# Patient Record
Sex: FEMALE | Race: WHITE | Employment: FULL TIME | URBAN - METROPOLITAN AREA
[De-identification: names, ages, dates, MRNs, and addresses within clinical notes are randomized per-mention and may not be internally consistent; named-entity substitution may affect disease eponyms.]

---

## 2021-09-10 ENCOUNTER — HOSPITAL ENCOUNTER (OUTPATIENT)
Dept: LAB | Age: 41
Discharge: HOME | End: 2021-09-10

## 2021-09-10 LAB — SENTARA SPECIMEN COL,SENBCF: NORMAL

## 2021-09-10 PROCEDURE — 99001 SPECIMEN HANDLING PT-LAB: CPT

## 2022-05-03 ENCOUNTER — APPOINTMENT (OUTPATIENT)
Dept: CT IMAGING | Age: 42
End: 2022-05-03
Attending: PHYSICIAN ASSISTANT
Payer: COMMERCIAL

## 2022-05-03 ENCOUNTER — HOSPITAL ENCOUNTER (EMERGENCY)
Age: 42
Discharge: HOME | End: 2022-05-03
Attending: STUDENT IN AN ORGANIZED HEALTH CARE EDUCATION/TRAINING PROGRAM
Payer: COMMERCIAL

## 2022-05-03 VITALS
BODY MASS INDEX: 24.99 KG/M2 | WEIGHT: 150 LBS | HEART RATE: 113 BPM | SYSTOLIC BLOOD PRESSURE: 164 MMHG | HEIGHT: 65 IN | OXYGEN SATURATION: 98 % | DIASTOLIC BLOOD PRESSURE: 114 MMHG | TEMPERATURE: 97.3 F | RESPIRATION RATE: 16 BRPM

## 2022-05-03 DIAGNOSIS — S16.1XXA STRAIN OF NECK MUSCLE, INITIAL ENCOUNTER: Primary | ICD-10-CM

## 2022-05-03 DIAGNOSIS — S39.012A LOW BACK STRAIN, INITIAL ENCOUNTER: ICD-10-CM
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
## 2022-05-03 DIAGNOSIS — S30.0XXA PELVIC CONTUSION, INITIAL ENCOUNTER: ICD-10-CM

## 2022-05-03 LAB — HCG UR QL: NEGATIVE

## 2022-05-03 PROCEDURE — 99284 EMERGENCY DEPT VISIT MOD MDM: CPT

## 2022-05-03 PROCEDURE — 81025 URINE PREGNANCY TEST: CPT

## 2022-05-03 PROCEDURE — 74011250636 HC RX REV CODE- 250/636: Performed by: PHYSICIAN ASSISTANT

## 2022-05-03 PROCEDURE — 72131 CT LUMBAR SPINE W/O DYE: CPT

## 2022-05-03 PROCEDURE — 72125 CT NECK SPINE W/O DYE: CPT

## 2022-05-03 PROCEDURE — 74011250637 HC RX REV CODE- 250/637: Performed by: PHYSICIAN ASSISTANT

## 2022-05-03 PROCEDURE — 96372 THER/PROPH/DIAG INJ SC/IM: CPT

## 2022-05-03 PROCEDURE — 72192 CT PELVIS W/O DYE: CPT

## 2022-05-03 RX ORDER — METHOCARBAMOL 750 MG/1
750 TABLET, FILM COATED ORAL 4 TIMES DAILY
Qty: 20 TABLET | Refills: 0 | Status: SHIPPED | OUTPATIENT
Start: 2022-05-03 | End: 2022-05-08

## 2022-05-03 RX ORDER — IBUPROFEN 600 MG/1
600 TABLET ORAL
Qty: 20 TABLET | Refills: 0 | Status: SHIPPED | OUTPATIENT
Start: 2022-05-03

## 2022-05-03 RX ORDER — ONDANSETRON 4 MG/1
4 TABLET, ORALLY DISINTEGRATING ORAL
Status: COMPLETED | OUTPATIENT
Start: 2022-05-03 | End: 2022-05-03

## 2022-05-03 RX ORDER — HYDROMORPHONE HYDROCHLORIDE 1 MG/ML
1 INJECTION, SOLUTION INTRAMUSCULAR; INTRAVENOUS; SUBCUTANEOUS
Status: COMPLETED | OUTPATIENT
Start: 2022-05-03 | End: 2022-05-03

## 2022-05-03 RX ORDER — OXYCODONE AND ACETAMINOPHEN 5; 325 MG/1; MG/1
1 TABLET ORAL
Qty: 12 TABLET | Refills: 0 | Status: SHIPPED | OUTPATIENT
Start: 2022-05-03 | End: 2022-05-06

## 2022-05-03 RX ORDER — KETOROLAC TROMETHAMINE 30 MG/ML
30 INJECTION, SOLUTION INTRAMUSCULAR; INTRAVENOUS
Status: COMPLETED | OUTPATIENT
Start: 2022-05-03 | End: 2022-05-03

## 2022-05-03 RX ADMIN — ONDANSETRON 4 MG: 4 TABLET, ORALLY DISINTEGRATING ORAL at 15:19

## 2022-05-03 RX ADMIN — HYDROMORPHONE HYDROCHLORIDE 1 MG: 1 INJECTION, SOLUTION INTRAMUSCULAR; INTRAVENOUS; SUBCUTANEOUS at 15:19

## 2022-05-03 RX ADMIN — KETOROLAC TROMETHAMINE 30 MG: 30 INJECTION, SOLUTION INTRAMUSCULAR at 15:19

## 2022-05-03 NOTE — ED PROVIDER NOTES
EMERGENCY DEPARTMENT HISTORY AND PHYSICAL EXAM    Date: 5/3/2022  Patient Name: Jordana Hill    History of Presenting Illness     Chief Complaint   Patient presents with   Prince Raygoza Fall    Back Pain         History Provided By: Patient    Chief Complaint: fall, back pain    HPI(Context):   12:49 PM  Jordana Hill is a 46 y.o. female who presents to the emergency department C/O fall on stairs. Associated sxs include low back pain and neck soreness. Pt had mechanical fall at home down 12 carpeted stairs one day ago. pt was seen at [facility] ED for same yesterday. Xray lumbar spine and pelvis unremarkable. Pt notes pain has not improved. Worse with movement. Pt denies urinary retention, urinary/fecal incontinence, head trauma, LOC, blood thinner use, abdominal pain, and any other sxs or complaints. PCP: Sandra Villegas MD        Past History     Past Medical History:  No past medical history on file. Past Surgical History:  No past surgical history on file. Family History:  No family history on file. Social History:  Social History     Tobacco Use    Smoking status: Not on file    Smokeless tobacco: Not on file   Substance Use Topics    Alcohol use: Not on file    Drug use: Not on file       Allergies:  No Known Allergies      Review of Systems   Review of Systems   Gastrointestinal: Negative for nausea and vomiting. Genitourinary: Negative for difficulty urinating. Musculoskeletal: Positive for back pain, myalgias, neck pain and neck stiffness. Neurological: Negative for dizziness, syncope, weakness, light-headedness, numbness and headaches. Hematological: Does not bruise/bleed easily. All other systems reviewed and are negative. Physical Exam     Vitals:    05/03/22 1220   BP: (!) 164/114   Pulse: (!) 113   Resp: 16   Temp: 97.3 °F (36.3 °C)   SpO2: 98%   Weight: 68 kg (150 lb)   Height: 5' 5\" (1.651 m)     Physical Exam  Vitals and nursing note reviewed.    Constitutional:       General: She is not in acute distress. Appearance: She is well-developed. She is not diaphoretic. Comments:  female that appears uncomfortable. Anxious. In C collar. Answering questions. Following directions. HENT:      Head: Normocephalic and atraumatic. No raccoon eyes, Manzanares's sign, right periorbital erythema or left periorbital erythema. Right Ear: External ear normal. No hemotympanum. Left Ear: External ear normal. No hemotympanum. Nose: Nose normal. No mucosal edema or rhinorrhea. Right Sinus: No maxillary sinus tenderness or frontal sinus tenderness. Left Sinus: No maxillary sinus tenderness or frontal sinus tenderness. Mouth/Throat:      Mouth: No oral lesions. Dentition: No dental abscesses. Pharynx: Uvula midline. No oropharyngeal exudate, posterior oropharyngeal erythema or uvula swelling. Tonsils: No tonsillar abscesses. Eyes:      General: No scleral icterus. Right eye: No discharge. Left eye: No discharge. Conjunctiva/sclera: Conjunctivae normal.   Cardiovascular:      Rate and Rhythm: Regular rhythm. Tachycardia present. Heart sounds: Normal heart sounds. No murmur heard. No friction rub. No gallop. Pulmonary:      Effort: Pulmonary effort is normal. No tachypnea, accessory muscle usage or respiratory distress. Breath sounds: Normal breath sounds. No decreased breath sounds, wheezing, rhonchi or rales. Abdominal:      Palpations: Abdomen is soft. Tenderness: There is no abdominal tenderness. There is no right CVA tenderness, left CVA tenderness, guarding or rebound. Negative signs include McBurney's sign. Musculoskeletal:      Cervical back: Normal range of motion. No erythema or signs of trauma. Pain with movement, spinous process tenderness and muscular tenderness present. Normal range of motion. Thoracic back: Normal. No swelling or deformity. Normal range of motion.       Lumbar back: Tenderness (left lumbar paraspinal muscle tenderness) present. No swelling or deformity. Decreased range of motion. Skin:     General: Skin is warm and dry. Neurological:      Mental Status: She is alert and oriented to person, place, and time. Mental status is at baseline. GCS: GCS eye subscore is 4. GCS verbal subscore is 5. GCS motor subscore is 6. Cranial Nerves: No cranial nerve deficit. Sensory: Sensation is intact. Motor: Motor function is intact. Coordination: Coordination is intact. Gait: Gait is intact. Psychiatric:         Mood and Affect: Mood is anxious. Judgment: Judgment normal.      Comments: Anxious and tearful at times             Diagnostic Study Results     Labs -     Recent Results (from the past 12 hour(s))   HCG URINE, QL. - POC    Collection Time: 05/03/22  2:08 PM   Result Value Ref Range    Pregnancy test,urine (POC) Negative NEG         Radiologic Studies     CT PELV WO CONT   Final Result      No evidence of acute fracture or dislocation. Fluid and gas within the lower cervix. Correlate with pelvic ultrasound. CT SPINE LUMB WO CONT   Final Result   1. No evidence of fracture or acute traumatic listhesis involving the lumbar   spine. CT SPINE CERV WO CONT   Final Result      Straightening of usual cervical lordosis without evidence of fracture or acute   traumatic listhesis. CT Results  (Last 48 hours)               05/03/22 1440  CT PELV WO CONT Final result    Impression:      No evidence of acute fracture or dislocation. Fluid and gas within the lower cervix. Correlate with pelvic ultrasound. Narrative:  EXAM: CT of the pelvis       INDICATION: Pain. COMPARISON: None. TECHNIQUE: Axial CT imaging of the pelvis was performed without intravenous   contrast. Multiplanar reformats were generated.        One or more dose reduction techniques were used on this CT: automated exposure   control, adjustment of the mAs and/or kVp according to patient size, and   iterative reconstruction techniques. The specific techniques used on this CT   exam have been documented in the patient's electronic medical record. Digital   Imaging and Communications in Medicine (DICOM) format image data are available   to nonaffiliated external healthcare facilities or entities on a secure, media   free, reciprocally searchable basis with patient authorization for at least a   12-month period after this study. _______________       FINDINGS:       BONES: No evidence of acute fracture or dislocation. Joint spaces and alignment   are maintained. No aggressive appearing osseous lytic or blastic lesion. IMPRESSION       PELVIC ORGANS: Fluid and gas within the lower cervix. VASCULATURE: Unremarkable       LYMPH NODES: No enlarged lymph nodes. GASTROINTESTINAL TRACT: No bowel dilation or wall thickening. OTHER: None.       _______________           05/03/22 1438  CT SPINE LUMB WO CONT Final result    Impression:  1. No evidence of fracture or acute traumatic listhesis involving the lumbar   spine. Narrative:  Examination: CT lumbar spine without contrast.       HISTORY: Lumbar back pain after fall downstairs. TECHNIQUE: CT imaging of the lumbar spine was performed in the axial plane   utilizing both bone and soft tissue reconstruction algorithms. Standard coronal   and sagittal reformatted images were performed by the technologist and are   included in interpretation. One or more dose reduction techniques were used on this CT: automated exposure   control, adjustment of the mAs and/or kVp according to patient size, and   iterative reconstruction techniques. The specific techniques used on this CT   exam have been documented in the patient's electronic medical record.   Digital   Imaging and Communications in Medicine (DICOM) format image data are available   to nonaffiliated external healthcare facilities or entities on a secure, media   free, reciprocally searchable basis with patient authorization for at least a   12-month period after this study. COMPARISON:       No prior imaging. FINDINGS:       Coronal reformatted images demonstrate minor levorotatory curvature of the mid   lumbar spine. Sagittal imaging demonstrates no listhesis. Vertebral body heights are normal. No fracture demonstrated. No pars   interarticularis defect. Facet joints are normally aligned. Small bone island   within the L4 vertebral body. No aggressive appearing osseous lesion. Correlation of axial and sagittal imaging demonstrates no area of high-grade   osseous canal or neuroforaminal impingement. Included retroperitoneal soft tissue structures demonstrate no acute or   significant abnormality. Included portions of the sacrum are intact. 05/03/22 1431  CT SPINE CERV WO CONT Final result    Impression:      Straightening of usual cervical lordosis without evidence of fracture or acute   traumatic listhesis. Narrative:  EXAM: CT Cervical spine       INDICATION: Neck pain after fall down stairs. COMPARISON: No prior study. TECHNIQUE: Axial CT imaging of the cervical spine was performed from the skull   base to the upper thoracic spine without intravenous contrast. Multiplanar   reformats were generated. One or more dose reduction techniques were used on this CT: automated exposure   control, adjustment of the mAs and/or kVp according to patient size, and   iterative reconstruction techniques. The specific techniques used on this CT   exam have been documented in the patient's electronic medical record.   Digital   Imaging and Communications in Medicine (DICOM) format image data are available   to nonaffiliated external healthcare facilities or entities on a secure, media   free, reciprocally searchable basis with patient authorization for at least a   12-month period after this study. _______________       FINDINGS:       VERTEBRAE AND DISCS: There is straightening of usual cervical lordosis without   evidence of listhesis. Vertebral body heights are normal. No displaced fracture. Scattered areas of mild facet arthrosis. No facet malalignment. SPINAL CANAL AND FORAMINA: No high grade spinal canal or foramina stenosis is   seen. PREVERTEBRAL SOFT TISSUES: Normal       VISIBLE INTRACRANIAL CONTENTS: Unremarkable. LUNG APICES: Clear. OTHER: None.       _______________               CXR Results  (Last 48 hours)    None          Medications given in the ED-  Medications   HYDROmorphone (DILAUDID) injection 1 mg (1 mg IntraMUSCular Given 5/3/22 1519)   ondansetron (ZOFRAN ODT) tablet 4 mg (4 mg Oral Given 5/3/22 1519)   ketorolac (TORADOL) injection 30 mg (30 mg IntraMUSCular Given 5/3/22 1519)         Medical Decision Making   I am the first provider for this patient. I reviewed the vital signs, available nursing notes, past medical history, past surgical history, family history and social history. Vital Signs-Reviewed the patient's vital signs. Pulse Oximetry Analysis - 98% on RA. NORMAL     Records Reviewed: Nursing Notes, Old Medical Records and Previous Radiology Studies    Provider Notes (Medical Decision Making): Mechanical fall at home one day ago. Pt had xray of L spine and pelvis at [facility] ED which was negative. Will CT pelvic and CT C/L spine. Doubt visceral organ injury. No FND. Pt ambulatory    Procedures:  Procedures    ED Course:   12:49 PM Initial assessment performed. The patients presenting problems have been discussed, and they are in agreement with the care plan formulated and outlined with them. I have encouraged them to ask questions as they arise throughout their visit. Diagnosis and Disposition       Imaging without fx. Benign pelvis. No indication for US. Pt feels better. Ambulatory with brisk walk in ED. Will tx sxs. Rest. PCP FU. HR in 90s on my reassessment prior to discharge. Reasons to RTED discussed with pt. All questions answered. Pt feels comfortable going home at this time. Pt expressed understanding and she agrees with plan. 1. Strain of neck muscle, initial encounter    2. Low back strain, initial encounter    3. Pelvic contusion, initial encounter    4. Fall on steps, initial encounter        PLAN:  1. D/C Home  2. Discharge Medication List as of 5/3/2022  4:00 PM      START taking these medications    Details   oxyCODONE-acetaminophen (Percocet) 5-325 mg per tablet Take 1 Tablet by mouth every six (6) hours as needed for Pain for up to 3 days. Max Daily Amount: 4 Tablets., Normal, Disp-12 Tablet, R-0      methocarbamoL (Robaxin-750) 750 mg tablet Take 1 Tablet by mouth four (4) times daily for 5 days. , Normal, Disp-20 Tablet, R-0      ibuprofen (MOTRIN) 600 mg tablet Take 1 Tablet by mouth every six (6) hours as needed for Pain. Take with food., Normal, Disp-20 Tablet, R-0           3. Follow-up Information     Follow up With Specialties Details Why Contact Info    Frieda Crawley MD Family Medicine   [address] [place] [id number] [phone], Fidelia Padron DO Orthopedic Surgery  may follow up with orthopedist [address] [place] [address] and Pardeep GERMAN [phone]. [phone]      [facility] EMERGENCY DEPT Emergency Medicine   [address] [place] [address]  [phone]        _______________________________    Attestations: This note is prepared by Kemi Cerrato PA-C.  _______________________________          Please note that this dictation was completed with [employer], the [employer] voice recognition software. Quite often unanticipated grammatical, syntax, homophones, and other interpretive errors are inadvertently transcribed by the computer software. Please disregard these errors.   Please excuse any errors that have escaped final proofreading.

## 2022-05-03 NOTE — ED TRIAGE NOTES
Pt arrives ambulatory to ED with c\o low back and neck pain s\p falling down 10 stairs on Sunday, pt tearful in triage, c collar placed on pt in triage

## 2022-05-03 NOTE — Clinical Note
[facility] [place] [place]  [facility] EMERGENCY DEPT  [address] [facility] [address]  [id number] [phone]    Work/School Note    Date: 5/3/2022    To Whom It May concern:    Hesham Vasquez was seen and treated today in the emergency room by the following provider(s):  Attending Provider: Yani Hodges DO  Physician Assistant: Laurence Bright PA-C. Hesham Vasquez is excused from work/school on 05/03/22 and 05/04/22. She is medically clear to return to work/school on 5/5/2022.        Sincerely,          Galina Reynaga PA-C

## 2022-05-03 NOTE — Clinical Note
[facility] [place] [place]  [facility]  [address]  [facility] NEWS [address]  [address] [phone]    Work/School Note    Date: 5/3/2022    To Whom It May concern:    Jasvir Mathews was seen and treated today in the emergency room by the following provider(s):  Attending Provider: Isac Bedoya DO  Physician Assistant: Campbell Helton PA-C. Jasvir Mathews is excused from work/school on 05/03/22 and 05/04/22. She is medically clear to return to work/school on 5/5/2022.        Sincerely,          Carina Gar PA-C

## 2022-05-07 ENCOUNTER — HOSPITAL ENCOUNTER (EMERGENCY)
Age: 42
Discharge: HOME | End: 2022-05-07
Attending: EMERGENCY MEDICINE
Payer: COMMERCIAL

## 2022-05-07 VITALS
WEIGHT: 150 LBS | SYSTOLIC BLOOD PRESSURE: 142 MMHG | HEART RATE: 99 BPM | OXYGEN SATURATION: 94 % | BODY MASS INDEX: 24.99 KG/M2 | DIASTOLIC BLOOD PRESSURE: 97 MMHG | HEIGHT: 65 IN | RESPIRATION RATE: 19 BRPM | TEMPERATURE: 97.1 F

## 2022-05-07 DIAGNOSIS — M54.50 ACUTE LEFT-SIDED LOW BACK PAIN WITHOUT SCIATICA: Primary | ICD-10-CM

## 2022-05-07 PROCEDURE — 74011000250 HC RX REV CODE- 250: Performed by: PHYSICIAN ASSISTANT

## 2022-05-07 PROCEDURE — 96372 THER/PROPH/DIAG INJ SC/IM: CPT

## 2022-05-07 PROCEDURE — 99284 EMERGENCY DEPT VISIT MOD MDM: CPT

## 2022-05-07 PROCEDURE — 74011250636 HC RX REV CODE- 250/636: Performed by: PHYSICIAN ASSISTANT

## 2022-05-07 RX ORDER — METHYLPREDNISOLONE 4 MG/1
TABLET ORAL
Qty: 1 DOSE PACK | Refills: 0 | OUTPATIENT
Start: 2022-05-07 | End: 2022-09-29

## 2022-05-07 RX ORDER — KETOROLAC TROMETHAMINE 30 MG/ML
30 INJECTION, SOLUTION INTRAMUSCULAR; INTRAVENOUS
Status: COMPLETED | OUTPATIENT
Start: 2022-05-07 | End: 2022-05-07

## 2022-05-07 RX ORDER — LIDOCAINE 4 G/100G
PATCH TOPICAL
Qty: 12 EACH | Refills: 0 | OUTPATIENT
Start: 2022-05-07 | End: 2022-09-29

## 2022-05-07 RX ORDER — LIDOCAINE 4 G/100G
1 PATCH TOPICAL EVERY 24 HOURS
Status: DISCONTINUED | OUTPATIENT
Start: 2022-05-07 | End: 2022-05-07 | Stop reason: HOSPADM

## 2022-05-07 RX ORDER — KETOROLAC TROMETHAMINE 30 MG/ML
30 INJECTION, SOLUTION INTRAMUSCULAR; INTRAVENOUS
Status: DISCONTINUED | OUTPATIENT
Start: 2022-05-07 | End: 2022-05-07

## 2022-05-07 RX ADMIN — KETOROLAC TROMETHAMINE 30 MG: 30 INJECTION, SOLUTION INTRAMUSCULAR; INTRAVENOUS at 18:11

## 2022-05-07 NOTE — DISCHARGE INSTRUCTIONS
Ice to area of pain 20 min, 3-4 times a day. May try moist heat after 3 days of pain, 20 min, 3-4 times a day. No heavy lifting, pushing, pulling until pain resolves. Activity as tolerated. Medications as prescribed. No NSAIDS while taking steroids  Steroids as prescribed  Tylenol 325mg, 2 every 6 hours as needed for additional pain relief. Return to ED if fever, worsening pain, leg weakness, urine or stool incontinence, or saddle anesthesia, or any new concerns.

## 2022-05-07 NOTE — ED PROVIDER NOTES
EMERGENCY DEPARTMENT HISTORY & PHYSICAL EXAM    [facility] EMERGENCY DEPT  5/7/2022, 6:24 PM    Clinical Impression:  1. Acute left-sided low back pain without sciatica        Assessment/Differential Diagnosis:     Ddx bony injury, spinal cord injury, hip injury, musculoskeletal pain all considered. ED Course:   Initial assessment performed. The patients presenting problems have been discussed, and they are in agreement with the care plan formulated and outlined with them. I have encouraged them to ask questions as they arise throughout their visit. Pt fell 5/1, seen [address] with neg xrays, seen here 5/3 with neg CT cervical/lumbar/pelvis. Here with continued pain left low back. Taking medications sporadically, and working doing flower arrangements. Sister at bedside and very concerned/attentitive. Exam with focal tenderness low left back, + spasm noted. Neurovasc intact. Long discussion with pt and sister as OP f/u is essential to continue evaluation and treatment. No indication for additional testing at this time. Will have her stop motrin, try medrol dose pack. Toradol given in ED. Pt states they will call orthopaedist tomorrow for follow up  Return precautions given           Medical Chart Review:  I have reviewed triage nursing documentation. Review of old medical records with the following pertinent information:     Disposition:  Home  in good condition. Chief Complaint   Patient presents with    Back Pain     HPI:    The history is provided by patient, and pts sister. No  used. Danielle Basurto is a 46 y.o. female presenting to the Emergency Department with complaints of continued left low back pain. Patient comes to the ED with her sister. Patient had fallen down the steps on the 1 May, was seen at [facility] on the second. X-rays of her back and hip were negative.   She continued with pain so came to our facility on 3 May. At that time she had CT of her C-spine L-spine and pelvis which was negative. She has been taking Motrin, Percocet and a muscle relaxer, although she has been taking it intermittently. She has been ambulatory with pain. Sister relates that she did spend several hours yesterday arranging flowers to help a friend. There is been no new symptoms. Been no leg weakness, saddle anesthesia or incontinence. No new injury. Does smoke cigarettes, she drinks alcohol on a daily basis. She states she drinks about 3 alcoholic beverages per day but her sister states that it is much more. She does do cocaine occasionally but denies any other drug use. No history of narcotic abuse or Suboxone use. I have reviewed all PMHX, FMHX and Social Hx as entered into the medical record in the chart below using the Epic Template. Review of Systems:  Constitutional: neg for fever, chills  ENT:  neg for URI symptoms  Respiratory:  neg for cough, shortness of breath  Cardiovascular:  neg for chest pain  GI:  neg for abdominal pain. :  No dysuria, hematuria. No Flank pain. MSK: positive for back  pain. Neg for extremity pain. No new injury  Integumentary: no rashes, or skin trauma  Neurological: neg for headaches  All other systems reviewed negative with exception of positives in ROS and HPI. Past Medical History:  History reviewed. No pertinent past medical history. Past Surgical History:  History reviewed. No pertinent surgical history. Family History:  History reviewed. No pertinent family history.     Social History:  Social History     Tobacco Use    Smoking status: Current Every Day Smoker     Packs/day: 0.25    Smokeless tobacco: Never Used   Substance Use Topics    Alcohol use: Yes     Comment: 1 cocktail/daily    Drug use: Not Currently       Allergies:  No Known Allergies    Vital Signs:  Vitals:    05/07/22 1723   BP: (!) 142/97   Pulse: 99   Resp: 19   Temp: 97.1 °F (36.2 °C)   SpO2: 94% Weight: 68 kg (150 lb)   Height: 5' 5\" (1.651 m)     Physical Exam:  Vital Signs Reviewed. Nursing Notes Reviewed. Constitutional:  Well developed, well nourished patient. Appearance and behavior are age and situation appropriate. Ambulating with pain, without assistance, no foot drop  Head: Normocephalic, Atraumatic  Eyes: Conjunctiva clear, lids normal. Sclera anicteric. ENT:hearing grossly intact  Neck:  supple, no meningeal signs, No swelling. Lungs: No respiratory distress, CTAB. No cough. CV:  regular rate and rhythm, no murmur   ABD: soft, nontender, normal BS, no mass   Extremities:  good strength and sensation, bilat equal,  with testing of LE. Feet neurovasc intact   Neuro:  A&O x 3. CN II-XII grossly intact. No gross neuro deficits. DTRs wnl, bilat equal at patella and achilles . SLR neg . Skin:  Warm, dry, no rash. Spine:  No midline tenderness to palpation of the cervical, thoracic or lumbar spine. No obvious bony defect. No swelling. Skin with no signs of trauma. Paraspinous Muscles  she does have point tenderness, reproducible pain with palpation to the left lower lumbar region. Muscles are tense with spasm. Skin appears healthy with no signs of trauma. Patient has no pain at midline and there is no swelling. Diagnostics:    Labs -   No results found for this or any previous visit (from the past 12 hour(s)). Radiologic Studies -   No orders to display     CT Results  (Last 48 hours)    None        CXR Results  (Last 48 hours)    None          Medications given in the ED-  Medications   lidocaine 4 % patch 1 Patch (1 Patch TransDERmal Apply Patch 5/7/22 1809)   ketorolac (TORADOL) injection 30 mg (30 mg IntraMUSCular Given 5/7/22 1811)         Please note that this dictation was completed with [facility], the [employer] voice recognition software.   Quite often unanticipated grammatical, syntax, homophones, and other interpretive errors are inadvertently transcribed by the computer software. Please disregard these errors. Please excuse any errors that have escaped final proofreading.

## 2022-05-07 NOTE — ED TRIAGE NOTES
Patient arrives ambulatory and then with wheelchair to triage after falling down stairs last Sunday, c/o lower left sided back pain, has been seen and CTd previously at [facility], remains continent, full sensations present.

## 2022-09-07 ENCOUNTER — APPOINTMENT (OUTPATIENT)
Dept: CT IMAGING | Age: 42
End: 2022-09-07
Attending: EMERGENCY MEDICINE
Payer: COMMERCIAL

## 2022-09-07 ENCOUNTER — APPOINTMENT (OUTPATIENT)
Dept: GENERAL RADIOLOGY | Age: 42
End: 2022-09-07
Attending: EMERGENCY MEDICINE
Payer: COMMERCIAL

## 2022-09-07 ENCOUNTER — HOSPITAL ENCOUNTER (EMERGENCY)
Age: 42
Discharge: HOME | End: 2022-09-07
Attending: EMERGENCY MEDICINE
Payer: COMMERCIAL

## 2022-09-07 VITALS
DIASTOLIC BLOOD PRESSURE: 85 MMHG | HEIGHT: 65 IN | SYSTOLIC BLOOD PRESSURE: 126 MMHG | BODY MASS INDEX: 24.99 KG/M2 | RESPIRATION RATE: 18 BRPM | TEMPERATURE: 99 F | HEART RATE: 108 BPM | WEIGHT: 150 LBS | OXYGEN SATURATION: 97 %

## 2022-09-07 DIAGNOSIS — U07.1 ACUTE BRONCHITIS DUE TO COVID-19 VIRUS: Primary | ICD-10-CM

## 2022-09-07 DIAGNOSIS — K80.20 SYMPTOMATIC CHOLELITHIASIS: ICD-10-CM

## 2022-09-07 DIAGNOSIS — J20.8 ACUTE BRONCHITIS DUE TO COVID-19 VIRUS: Primary | ICD-10-CM

## 2022-09-07 DIAGNOSIS — Q18.1 PREAURICULAR CYST: ICD-10-CM

## 2022-09-07 LAB
ANION GAP SERPL CALC-SCNC: 7 MMOL/L (ref 3–18)
ATRIAL RATE: 91 BPM
BASOPHILS # BLD: 0 K/UL (ref 0–0.1)
BASOPHILS NFR BLD: 0 % (ref 0–2)
BUN SERPL-MCNC: 6 MG/DL (ref 7–18)
BUN/CREAT SERPL: 10 (ref 12–20)
CALCIUM SERPL-MCNC: 9.3 MG/DL (ref 8.5–10.1)
CALCULATED P AXIS, ECG09: 17 DEGREES
CALCULATED R AXIS, ECG10: -3 DEGREES
CALCULATED T AXIS, ECG11: 15 DEGREES
CHLORIDE SERPL-SCNC: 104 MMOL/L (ref 100–111)
CK SERPL-CCNC: 63 U/L (ref 26–192)
CO2 SERPL-SCNC: 27 MMOL/L (ref 21–32)
CREAT SERPL-MCNC: 0.62 MG/DL (ref 0.6–1.3)
DIAGNOSIS, 93000: NORMAL
DIFFERENTIAL METHOD BLD: ABNORMAL
EOSINOPHIL # BLD: 0 K/UL (ref 0–0.4)
EOSINOPHIL NFR BLD: 1 % (ref 0–5)
ERYTHROCYTE [DISTWIDTH] IN BLOOD BY AUTOMATED COUNT: 12.6 % (ref 11.6–14.5)
GLUCOSE SERPL-MCNC: 110 MG/DL (ref 74–99)
HCG SERPL QL: NEGATIVE
HCT VFR BLD AUTO: 42.6 % (ref 35–45)
HGB BLD-MCNC: 14.7 G/DL (ref 12–16)
IMM GRANULOCYTES # BLD AUTO: 0.1 K/UL (ref 0–0.04)
IMM GRANULOCYTES NFR BLD AUTO: 1 % (ref 0–0.5)
LYMPHOCYTES # BLD: 2.3 K/UL (ref 0.9–3.6)
LYMPHOCYTES NFR BLD: 34 % (ref 21–52)
MAGNESIUM SERPL-MCNC: 1.7 MG/DL (ref 1.6–2.6)
MCH RBC QN AUTO: 36.5 PG (ref 24–34)
MCHC RBC AUTO-ENTMCNC: 34.5 G/DL (ref 31–37)
MCV RBC AUTO: 105.7 FL (ref 78–100)
MONOCYTES # BLD: 0.8 K/UL (ref 0.05–1.2)
MONOCYTES NFR BLD: 12 % (ref 3–10)
NEUTS SEG # BLD: 3.5 K/UL (ref 1.8–8)
NEUTS SEG NFR BLD: 52 % (ref 40–73)
NRBC # BLD: 0 K/UL (ref 0–0.01)
NRBC BLD-RTO: 0 PER 100 WBC
P-R INTERVAL, ECG05: 130 MS
PLATELET # BLD AUTO: 212 K/UL (ref 135–420)
PMV BLD AUTO: 9.7 FL (ref 9.2–11.8)
POTASSIUM SERPL-SCNC: 3.5 MMOL/L (ref 3.5–5.5)
Q-T INTERVAL, ECG07: 348 MS
QRS DURATION, ECG06: 98 MS
QTC CALCULATION (BEZET), ECG08: 428 MS
RBC # BLD AUTO: 4.03 M/UL (ref 4.2–5.3)
SODIUM SERPL-SCNC: 138 MMOL/L (ref 136–145)
TROPONIN-HIGH SENSITIVITY: <3 NG/L (ref 0–54)
VENTRICULAR RATE, ECG03: 91 BPM
WBC # BLD AUTO: 6.8 K/UL (ref 4.6–13.2)

## 2022-09-07 PROCEDURE — 82550 ASSAY OF CK (CPK): CPT

## 2022-09-07 PROCEDURE — 71046 X-RAY EXAM CHEST 2 VIEWS: CPT

## 2022-09-07 PROCEDURE — 93005 ELECTROCARDIOGRAM TRACING: CPT

## 2022-09-07 PROCEDURE — 84484 ASSAY OF TROPONIN QUANT: CPT

## 2022-09-07 PROCEDURE — 71275 CT ANGIOGRAPHY CHEST: CPT

## 2022-09-07 PROCEDURE — 84703 CHORIONIC GONADOTROPIN ASSAY: CPT

## 2022-09-07 PROCEDURE — 74011000636 HC RX REV CODE- 636: Performed by: EMERGENCY MEDICINE

## 2022-09-07 PROCEDURE — 99285 EMERGENCY DEPT VISIT HI MDM: CPT

## 2022-09-07 PROCEDURE — 85025 COMPLETE CBC W/AUTO DIFF WBC: CPT

## 2022-09-07 PROCEDURE — 83735 ASSAY OF MAGNESIUM: CPT

## 2022-09-07 PROCEDURE — 80048 BASIC METABOLIC PNL TOTAL CA: CPT

## 2022-09-07 RX ORDER — CEPHALEXIN 250 MG/1
500 CAPSULE ORAL
Status: CANCELLED | OUTPATIENT
Start: 2022-09-07 | End: 2022-09-07

## 2022-09-07 RX ORDER — BACITRACIN 500 [USP'U]/G
OINTMENT TOPICAL
Status: CANCELLED | OUTPATIENT
Start: 2022-09-07 | End: 2022-09-07

## 2022-09-07 RX ADMIN — IOPAMIDOL 100 ML: 755 INJECTION, SOLUTION INTRAVENOUS at 20:01

## 2022-09-07 NOTE — ED TRIAGE NOTES
Pt is here for two issues. Has growth on right ear x 1 year that has recently become red/irritated & has accelerated in growth. Pt dgx w covid 10 days ago, still having fatigue, now also having sore throat and cough.

## 2022-09-08 ENCOUNTER — PATIENT OUTREACH (OUTPATIENT)
Dept: CASE MANAGEMENT | Age: 42
End: 2022-09-08

## 2022-09-08 NOTE — PROGRESS NOTES
Patient contacted regarding COVID-19 diagnosis. Discussed COVID-19 related testing which was not done at this time. Test results were not done. Patient informed of results, if available? N/a. Per ED note, patient tested positive for COVID-19 10 days prior. Care Transition Nurse contacted the patient by telephone to perform post discharge assessment. Call within 2 business days of discharge: Yes Verified name and  with patient as identifiers. Provided introduction to self, and explanation of the CTN/ACM role, and reason for call due to risk factors for infection and/or exposure to COVID-19. Symptoms reviewed with patient who verbalized the following symptoms: fatigue, cough, and poor appetite, nasal congestion, ear pain, chest soreness . Productive cough, unknown mucous color. Reports symptoms are worse around 2-3 pm. She stopped cough medicine last week as her cough is improving and is usually worse in the morning. She was taking Nyquil and Dayquil Q4H for body aches which have resolved. Due to no new or worsening symptoms encounter was not routed to provider for escalation. Discussed follow-up appointments. If no appointment was previously scheduled, appointment scheduling offered:  no. [facility] follow up appointment(s): No future appointments. Non-[facility] follow up appointment(s): ENT next week    Interventions to address risk factors: Assessment and support for treatment adherence and medication management-Patient reports feeling better. Her main complaint is significant fatigue that occurs in the afternoons. She is interested in obtaining a Rx for cough medication for use at bedtime due to her cough waking her up during the night. CTN encouraged her to contact her PCP with this request and to see if a f/u appt is warranted. CTN encouraged her to expectorate mucous and monitor the color, call PCP for worsening symptoms or green/brown mucous.       Advance Care Planning:   Does patient have an Advance Directive: not on file. Educated patient about risk for severe COVID-19 due to risk factors according to CDC guidelines. CTN reviewed discharge instructions, medical action plan and red flag symptoms with the patient who verbalized understanding. Discussed COVID vaccination status: no. Education provided on COVID-19 vaccination as appropriate. Discussed exposure protocols and quarantine with CDC Guidelines. Patient was given an opportunity to verbalize any questions and concerns and agrees to contact CTN or health care provider for questions related to their healthcare. Was patient discharged with a pulse oximeter? no    CTN provided contact information. No further follow-up call identified based on severity of symptoms and risk factors.

## 2022-09-08 NOTE — ED PROVIDER NOTES
EMERGENCY DEPARTMENT HISTORY AND PHYSICAL EXAM    Date: 9/7/2022  Patient Name: Josselyn Arango    History of Presenting Illness     Chief Complaint   Patient presents with    Sore Throat    Ear Pain    Cough         History Provided By: Patient    Additional History (Context):   6:48 PM  Josselyn Arango is a 38 y.o. female with PMHX of cystic ear inflammation who presents to the emergency department C/O 2 different complaints. First, person primary she has a cystic-like growth anterior to the right ear. It is now becoming red and more swollen and is giving her significant discomfort distress. Second, she tested positive for COVID-19 10 days ago. She is complaining of generalized fatigue malaise sore throat and cough. She has no productive sputum or dyspnea. Fevers and chills have abated. She is not immunocompromised. She has not vaccinated. Social History  No smoking drinking or drugs    Family History  Negative for autoimmune disease or malignancies      PCP: Baljit Thomas MD    Current Outpatient Medications   Medication Sig Dispense Refill    methylPREDNISolone (Medrol, Khadar,) 4 mg tablet As directed 1 Dose Pack 0    lidocaine (Salonpas, lidocaine,) 4 % patch Apply one to area of pain every 24 hours as needed for pain 12 Each 0    ibuprofen (MOTRIN) 600 mg tablet Take 1 Tablet by mouth every six (6) hours as needed for Pain. Take with food. 20 Tablet 0       Past History     Past Medical History:  Past Medical History:   Diagnosis Date    Tachycardia        Past Surgical History:  History reviewed. No pertinent surgical history. Family History:  History reviewed. No pertinent family history.     Social History:  Social History     Tobacco Use    Smoking status: Every Day     Packs/day: 0.25     Types: Cigarettes    Smokeless tobacco: Never   Substance Use Topics    Alcohol use: Yes     Comment: 1 cocktail/daily    Drug use: Not Currently       Allergies:  No Known Allergies      Review of Systems Review of Systems   Constitutional:  Positive for chills and fever. HENT:  Positive for ear pain (Associated with increased risk), sinus pressure and sore throat. Eyes: Negative. Respiratory:  Positive for shortness of breath. Cardiovascular:  Positive for chest pain. Gastrointestinal: Negative. Endocrine: Negative. Genitourinary: Negative. Musculoskeletal: Negative. Skin: Negative. Allergic/Immunologic: Negative. Neurological: Negative. Hematological: Negative. Psychiatric/Behavioral:  The patient is nervous/anxious. All other systems reviewed and are negative. Physical Exam     Vitals:    09/07/22 1734   BP: 126/85   Pulse: (!) 108   Resp: 18   Temp: 99 °F (37.2 °C)   SpO2: 97%   Weight: 68 kg (150 lb)   Height: 5' 5\" (1.651 m)     Physical Exam  Vitals and nursing note reviewed. Constitutional:       General: She is not in acute distress. Appearance: She is well-developed. She is not diaphoretic. HENT:      Head: Normocephalic and atraumatic. Right Ear: Hearing, tympanic membrane and ear canal normal.      Left Ear: Hearing, tympanic membrane, ear canal and external ear normal.      Ears:      Comments: She has a well-circumscribed preauricular area or anterior to the tragus  Eyes:      General: No scleral icterus. Extraocular Movements:      Right eye: Normal extraocular motion. Left eye: Normal extraocular motion. Conjunctiva/sclera: Conjunctivae normal.      Pupils: Pupils are equal, round, and reactive to light. Neck:      Trachea: No tracheal deviation. Cardiovascular:      Rate and Rhythm: Regular rhythm. Tachycardia present. Heart sounds: Normal heart sounds. Pulmonary:      Effort: Pulmonary effort is normal. No respiratory distress. Breath sounds: Normal breath sounds. No stridor. Abdominal:      General: Bowel sounds are normal. There is no distension. Palpations: Abdomen is soft. Tenderness:  There is no abdominal tenderness. There is no rebound. Musculoskeletal:         General: No tenderness. Normal range of motion. Cervical back: Normal range of motion and neck supple. Comments: Grossly unremarkable without abnormalities   Skin:     General: Skin is warm and dry. Capillary Refill: Capillary refill takes less than 2 seconds. Findings: No erythema or rash. Neurological:      Mental Status: She is alert and oriented to person, place, and time. GCS: GCS eye subscore is 4. GCS verbal subscore is 5. GCS motor subscore is 6. Cranial Nerves: No cranial nerve deficit. Motor: No weakness. Psychiatric:         Attention and Perception: Attention normal.         Mood and Affect: Mood is anxious. Affect is tearful. Speech: Speech normal.         Behavior: Behavior normal.         Thought Content:  Thought content normal.         Judgment: Judgment normal.      Comments: Additionally she is calm, however in discussing the likelihood of significant disease she becomes quite tearful and anxious     Diagnostic Study Results     Labs -  Recent Results (from the past 24 hour(s))   EKG, 12 LEAD, INITIAL    Collection Time: 09/07/22  6:57 PM   Result Value Ref Range    Ventricular Rate 91 BPM    Atrial Rate 91 BPM    P-R Interval 130 ms    QRS Duration 98 ms    Q-T Interval 348 ms    QTC Calculation (Bezet) 428 ms    Calculated P Axis 17 degrees    Calculated R Axis -3 degrees    Calculated T Axis 15 degrees    Diagnosis       Normal sinus rhythm  Incomplete right bundle branch block  Nonspecific T wave abnormality  Abnormal ECG  No previous ECGs available  Confirmed by Kishan Melchor MD. ([id number]) on 9/7/2022 11:10:30 PM     CBC WITH AUTOMATED DIFF    Collection Time: 09/07/22  7:06 PM   Result Value Ref Range    WBC 6.8 4.6 - 13.2 K/uL    RBC 4.03 (L) 4.20 - 5.30 M/uL    HGB 14.7 12.0 - 16.0 g/dL    HCT 42.6 35.0 - 45.0 %    .7 (H) 78.0 - 100.0 FL    MCH 36.5 (H) 24.0 - 34.0 PG    MCHC 34.5 31.0 - 37.0 g/dL    RDW 12.6 11.6 - 14.5 %    PLATELET 193 427 - 281 K/uL    MPV 9.7 9.2 - 11.8 FL    NRBC 0.0 0  WBC    ABSOLUTE NRBC 0.00 0.00 - 0.01 K/uL    NEUTROPHILS 52 40 - 73 %    LYMPHOCYTES 34 21 - 52 %    MONOCYTES 12 (H) 3 - 10 %    EOSINOPHILS 1 0 - 5 %    BASOPHILS 0 0 - 2 %    IMMATURE GRANULOCYTES 1 (H) 0.0 - 0.5 %    ABS. NEUTROPHILS 3.5 1.8 - 8.0 K/UL    ABS. LYMPHOCYTES 2.3 0.9 - 3.6 K/UL    ABS. MONOCYTES 0.8 0.05 - 1.2 K/UL    ABS. EOSINOPHILS 0.0 0.0 - 0.4 K/UL    ABS. BASOPHILS 0.0 0.0 - 0.1 K/UL    ABS. IMM. GRANS. 0.1 (H) 0.00 - 0.04 K/UL    DF AUTOMATED     METABOLIC PANEL, BASIC    Collection Time: 09/07/22  7:06 PM   Result Value Ref Range    Sodium 138 136 - 145 mmol/L    Potassium 3.5 3.5 - 5.5 mmol/L    Chloride 104 100 - 111 mmol/L    CO2 27 21 - 32 mmol/L    Anion gap 7 3.0 - 18 mmol/L    Glucose 110 (H) 74 - 99 mg/dL    BUN 6 (L) 7.0 - 18 MG/DL    Creatinine 0.62 0.6 - 1.3 MG/DL    BUN/Creatinine ratio 10 (L) 12 - 20      GFR est AA >60 >60 ml/min/1.73m2    GFR est non-AA >60 >60 ml/min/1.73m2    Calcium 9.3 8.5 - 10.1 MG/DL   TROPONIN-HIGH SENSITIVITY    Collection Time: 09/07/22  7:06 PM   Result Value Ref Range    Troponin-High Sensitivity <3 0 - 54 ng/L   HCG QL SERUM    Collection Time: 09/07/22  7:06 PM   Result Value Ref Range    HCG, Ql. Negative NEG     MAGNESIUM    Collection Time: 09/07/22  7:06 PM   Result Value Ref Range    Magnesium 1.7 1.6 - 2.6 mg/dL   CK    Collection Time: 09/07/22  7:06 PM   Result Value Ref Range    CK 63 26 - 192 U/L        Radiologic Studies -   CTA CHEST W OR W WO CONT   Final Result         1. No evidence of PE.   2. Possible mild inflammation around the gallbladder, although there is motion   artifact on this portion of the examination, making this an unreliable finding. XR CHEST PA LAT   Final Result      No active cardiopulmonary disease.         CT Results  (Last 48 hours)                 09/07/22 2002  CTA CHEST W OR W WO CONT Final result    Impression:          1. No evidence of PE.   2. Possible mild inflammation around the gallbladder, although there is motion   artifact on this portion of the examination, making this an unreliable finding. Narrative:  EXAM: CTA CHEST W OR W WO CONT       CLINICAL INDICATION/HISTORY: eval for covid related PE     > Additional: None       COMPARISON: None     > Correlative imaging: None. TECHNIQUE: Axial CT imaging from the thoracic inlet through the diaphragm with   intravenous contrast. Coronal and axial MIP reformats were generated. One or   more dose reduction techniques were used on this CT: automated exposure control,   adjustment of the mAs and/or kVp according to patient size, and iterative   reconstruction techniques. The specific techniques used on this CT exam have   been documented in the patient's electronic medical record. Digital imaging and   communications in medicine (DICOM) format image data are available to   nonaffiliated external healthcare facilities or entities on a secure, media   free, reciprocally searchable basis with patient authorization for at least a 12   month period after this study. _______________       FINDINGS:       EXAM QUALITY: Satisfactory. PULMONARY ARTERIES: No evidence of pulmonary embolism. MEDIASTINUM: Unremarkable. LUNGS and PLEURAL SPACES: Minimal atelectasis in each posterior lower lobe. The   lungs are otherwise clear. Pleural spaces are clear. AIRWAYS: Clear. CHEST WALL: Unremarkable. UPPER ABDOMEN: Moderately decreased       Attenuation. There is possibly some mild inflammation around the gallbladder.       _______________                 CXR Results  (Last 48 hours)                 09/07/22 1800  XR CHEST PA LAT Final result    Impression:      No active cardiopulmonary disease.        Narrative:  EXAM: Chest Radiography       CLINICAL INDICATION/HISTORY: Cough;      > Additional: None       COMPARISON: none       TECHNIQUE: PA and lateral       _______________       FINDINGS:       HEART AND MEDIASTINUM: Normal heart size with normal mediastinal contours. LUNGS AND PLEURAL SPACES: Clear. BONY THORAX AND SOFT TISSUES: Unremarkable.       _______________                   Medications given in the ED-  Medications   iopamidoL (ISOVUE-370) 76 % injection 100 mL (100 mL IntraVENous Given 9/7/22 2001)         Medical Decision Making   I am the first provider for this patient. I reviewed the vital signs, available nursing notes, past medical history, past surgical history, family history and social history. Vital Signs-Reviewed the patient's vital signs. Pulse Oximetry Analysis - 97% on room air    Cardiac Monitor:  Rate: 102 bpm  Rhythm: Sinus rhythm    EKG interpretation: (Preliminary)  6:57 PM    Normal sinus rhythm, rate 91, incomplete right bundle branch block with nonspecific T wave abnormality QTC is 428. EKG read by Shantanu Barrera MD      Records Reviewed: NURSING NOTES AND PREVIOUS MEDICAL RECORDS    Provider Notes (Medical Decision Making):   Patient is tearful and anxious this pleasant young lady becomes quite anxious in discussing possibilities infection chest including pneumonia and pulmonary embolism myocarditis. She is also even more stress involving involving problem to the right ear for which she already has ear nose and throat appointment. It likely needs marsupialization as opposed to simple drainage given size I would prefer she sees ear nose and throat doctor for definitive care. Will start on antibiotics. Studies done to look for complications of COVID and are negative however initial evidence of gallbladder disease, already known to patient, will reveal.  She shows no evidence of toxicity or gallbladder related symptoms, does not need further work-up for this.     Procedures:  Procedures    ED Course:   6:48 PM: Initial assessment performed. The patients presenting problems have been discussed, and they are in agreement with the care plan formulated and outlined with them. I have encouraged them to ask questions as they arise throughout their visit. Diagnosis and Disposition       DISCHARGE NOTE:  10:05 PM  Angela Winn's  results have been reviewed with her. She has been counseled regarding her diagnosis, treatment, and plan. She verbally conveys understanding and agreement of the signs, symptoms, diagnosis, treatment and prognosis and additionally agrees to follow up as discussed. She also agrees with the care-plan and conveys that all of her questions have been answered. I have also provided discharge instructions for her that include: educational information regarding their diagnosis and treatment, and list of reasons why they would want to return to the ED prior to their follow-up appointment, should her condition change. She has been provided with education for proper emergency department utilization. CLINICAL IMPRESSION:    1. Acute bronchitis due to COVID-19 virus    2. Symptomatic cholelithiasis    3. Preauricular cyst        PLAN:  1. D/C Home  2. Discharge Medication List as of 9/7/2022 10:05 PM        3.    Follow-up Information       Follow up With Specialties Details Why Contact Info    Keena Stevenson MD Family Medicine   [address]  [address] [place] [address]      Michelle Gandhi MD Otolaryngology, Surgery Physician  ask for the [place] office . [facility] [address] [id number] [phone]      Dyana Nixon, DO Colon and Rectal Surgery  schedule appointment after COVID symptoms have resolved [id number] [id number] [phone]      [facility] EMERGENCY DEPT Emergency Medicine   [address]  [phone]          _______________________________    This note was partially transcribed via voice recognition software. Although efforts have been made to catch any discrepancies, it may contain sound alike words, grammatical errors, or nonsensical words.

## 2022-09-28 ENCOUNTER — PATIENT OUTREACH (OUTPATIENT)
Dept: CASE MANAGEMENT | Age: 42
End: 2022-09-28

## 2022-09-28 NOTE — PROGRESS NOTES
Patient contacted regarding COVID-19 diagnosis. Discussed COVID-19 related testing which was not done at this time. Test results were not done. Patient informed of results, if available? N/a. Per ED note, patient tested positive for COVID-19 10 days prior. Care Transition Nurse contacted the patient by telephone to perform follow-up assessment. Verified name and  with patient as identifiers. Patient has following risk factors of: no known risk factors. Symptoms reviewed with patient who verbalized the following symptoms: fatigue, cough, shortness of breath, chills or shaking, sweating, and chest pain, poor appetite. Cough was productive and is now dry and persistent, causing heaving at times, and keeping her awake at night and interrupting sleep. Chest pain is r/t coughing. Reports occasional cold sweats and chills, mostly at night and in the morning. She had pain/aching joints and headaches with COVID-19, which resolved. These other symptoms developed after recovery from COVID-19. Her PCP prescribed cough medicine that contained hydrocodone and she did not like the side effects. Due to new onset of symptoms and no improvement of symptoms over the past few weeks, CTN advised the patient to get evaluated at [facility] or other urgent care facility. Interventions to address risk factors: Assistance in accessing community resources-Provided patient with the nearest [facility] address and hours. She agrees to go to [facility] for evaluation. She contacted her PCP to notify him of concerns and he is out of the office today, so she will try to reach him again tomorrow. . Patient was given an opportunity to verbalize any questions and concerns and agrees to contact CTN or health care provider for questions related to their healthcare. Was patient discharged with a pulse oximeter? no    CTN provided contact information.  Plan for follow-up call in 3-5 days based on severity of symptoms and risk factors.

## 2022-09-29 ENCOUNTER — HOSPITAL ENCOUNTER (EMERGENCY)
Age: 42
Discharge: HOME | End: 2022-09-29
Attending: EMERGENCY MEDICINE
Payer: COMMERCIAL

## 2022-09-29 VITALS
HEART RATE: 102 BPM | HEIGHT: 65 IN | BODY MASS INDEX: 24.99 KG/M2 | TEMPERATURE: 97.8 F | DIASTOLIC BLOOD PRESSURE: 96 MMHG | RESPIRATION RATE: 18 BRPM | WEIGHT: 150 LBS | SYSTOLIC BLOOD PRESSURE: 141 MMHG | OXYGEN SATURATION: 98 %

## 2022-09-29 DIAGNOSIS — R06.00 DYSPNEA, UNSPECIFIED TYPE: ICD-10-CM

## 2022-09-29 DIAGNOSIS — R07.89 ATYPICAL CHEST PAIN: Primary | ICD-10-CM

## 2022-09-29 LAB
ALBUMIN SERPL-MCNC: 4.6 G/DL (ref 3.4–5)
ALBUMIN/GLOB SERPL: 1.2 {RATIO} (ref 0.8–1.7)
ALP SERPL-CCNC: 65 U/L (ref 45–117)
ALT SERPL-CCNC: 92 U/L (ref 13–56)
ANION GAP SERPL CALC-SCNC: 16 MMOL/L (ref 3–18)
AST SERPL-CCNC: 148 U/L (ref 10–38)
ATRIAL RATE: 103 BPM
BASOPHILS # BLD: 0 K/UL (ref 0–0.1)
BASOPHILS NFR BLD: 1 % (ref 0–2)
BILIRUB SERPL-MCNC: 0.7 MG/DL (ref 0.2–1)
BUN SERPL-MCNC: 6 MG/DL (ref 7–18)
BUN/CREAT SERPL: 8 (ref 12–20)
CALCIUM SERPL-MCNC: 10 MG/DL (ref 8.5–10.1)
CALCULATED P AXIS, ECG09: 36 DEGREES
CALCULATED R AXIS, ECG10: 11 DEGREES
CALCULATED T AXIS, ECG11: 44 DEGREES
CHLORIDE SERPL-SCNC: 97 MMOL/L (ref 100–111)
CO2 SERPL-SCNC: 20 MMOL/L (ref 21–32)
CREAT SERPL-MCNC: 0.75 MG/DL (ref 0.6–1.3)
DIAGNOSIS, 93000: NORMAL
DIFFERENTIAL METHOD BLD: ABNORMAL
EOSINOPHIL # BLD: 0 K/UL (ref 0–0.4)
EOSINOPHIL NFR BLD: 1 % (ref 0–5)
ERYTHROCYTE [DISTWIDTH] IN BLOOD BY AUTOMATED COUNT: 13 % (ref 11.6–14.5)
GLOBULIN SER CALC-MCNC: 3.8 G/DL (ref 2–4)
GLUCOSE SERPL-MCNC: 73 MG/DL (ref 74–99)
HCT VFR BLD AUTO: 45.2 % (ref 35–45)
HGB BLD-MCNC: 15.6 G/DL (ref 12–16)
IMM GRANULOCYTES # BLD AUTO: 0 K/UL (ref 0–0.04)
IMM GRANULOCYTES NFR BLD AUTO: 0 % (ref 0–0.5)
LYMPHOCYTES # BLD: 2.1 K/UL (ref 0.9–3.6)
LYMPHOCYTES NFR BLD: 38 % (ref 21–52)
MCH RBC QN AUTO: 36.9 PG (ref 24–34)
MCHC RBC AUTO-ENTMCNC: 34.5 G/DL (ref 31–37)
MCV RBC AUTO: 106.9 FL (ref 78–100)
MONOCYTES # BLD: 0.7 K/UL (ref 0.05–1.2)
MONOCYTES NFR BLD: 12 % (ref 3–10)
NEUTS SEG # BLD: 2.7 K/UL (ref 1.8–8)
NEUTS SEG NFR BLD: 48 % (ref 40–73)
NRBC # BLD: 0 K/UL (ref 0–0.01)
NRBC BLD-RTO: 0 PER 100 WBC
P-R INTERVAL, ECG05: 122 MS
PLATELET # BLD AUTO: 158 K/UL (ref 135–420)
PMV BLD AUTO: 10 FL (ref 9.2–11.8)
POTASSIUM SERPL-SCNC: 3.9 MMOL/L (ref 3.5–5.5)
PROT SERPL-MCNC: 8.4 G/DL (ref 6.4–8.2)
Q-T INTERVAL, ECG07: 338 MS
QRS DURATION, ECG06: 94 MS
QTC CALCULATION (BEZET), ECG08: 442 MS
RBC # BLD AUTO: 4.23 M/UL (ref 4.2–5.3)
SODIUM SERPL-SCNC: 133 MMOL/L (ref 136–145)
TROPONIN-HIGH SENSITIVITY: <3 NG/L (ref 0–54)
VENTRICULAR RATE, ECG03: 103 BPM
WBC # BLD AUTO: 5.6 K/UL (ref 4.6–13.2)

## 2022-09-29 PROCEDURE — 84484 ASSAY OF TROPONIN QUANT: CPT

## 2022-09-29 PROCEDURE — 93005 ELECTROCARDIOGRAM TRACING: CPT

## 2022-09-29 PROCEDURE — 99284 EMERGENCY DEPT VISIT MOD MDM: CPT

## 2022-09-29 PROCEDURE — 80053 COMPREHEN METABOLIC PANEL: CPT

## 2022-09-29 PROCEDURE — 74011250636 HC RX REV CODE- 250/636: Performed by: PHYSICIAN ASSISTANT

## 2022-09-29 PROCEDURE — 85025 COMPLETE CBC W/AUTO DIFF WBC: CPT

## 2022-09-29 RX ORDER — METHYLPREDNISOLONE 4 MG/1
TABLET ORAL
Qty: 1 DOSE PACK | Refills: 0 | Status: SHIPPED | OUTPATIENT
Start: 2022-09-29

## 2022-09-29 RX ADMIN — SODIUM CHLORIDE, SODIUM LACTATE, POTASSIUM CHLORIDE, AND CALCIUM CHLORIDE 1000 ML: 600; 310; 30; 20 INJECTION, SOLUTION INTRAVENOUS at 14:41

## 2022-09-29 NOTE — ED PROVIDER NOTES
EMERGENCY DEPARTMENT HISTORY AND PHYSICAL EXAM    Date: 9/29/2022  Patient Name: Josselyn Arango    History of Presenting Illness     Chief Complaint   Patient presents with    Fatigue    Headache    Chest Pain         History Provided By: Patient    Chief Complaint: fatigue, chest pain, headache    HPI(Context):   2:17 PM  Josselyn Arango is a 38 y.o. female with PMHX of tachycardia who presents to the emergency department C/O cough. Associated sxs include chest pain and headache. Pt had COVID-19 one month ago. Pt is not vaccinated. Pt notes cough was originally productive now dry. Pt notes sxs worse in AM. Pt had CTA chest at this facility on 09/07/2022 with no PA. Pt has known cholelithiasis. Pt denies fever, hx of DVT/PE, hx of resp problems, hx of cardiac problems, and any other sxs or complaints. Pt is actively smoking    PCP: Baljit Thomas MD    Current Outpatient Medications   Medication Sig Dispense Refill    methylPREDNISolone (Medrol, Khadar,) 4 mg tablet Take with food. 1 Dose Pack 0    ibuprofen (MOTRIN) 600 mg tablet Take 1 Tablet by mouth every six (6) hours as needed for Pain. Take with food. 20 Tablet 0       Past History     Past Medical History:  Past Medical History:   Diagnosis Date    Tachycardia        Past Surgical History:  History reviewed. No pertinent surgical history. Family History:  History reviewed. No pertinent family history. Social History:  Social History     Tobacco Use    Smoking status: Every Day     Packs/day: 0.50     Types: Cigarettes    Smokeless tobacco: Never   Substance Use Topics    Alcohol use: Yes     Comment: 1 cocktail/daily    Drug use: Not Currently       Allergies:  No Known Allergies      Review of Systems   Review of Systems   Constitutional:  Positive for unexpected weight change (over last 2 years). Negative for chills and fever. Respiratory:  Positive for cough and shortness of breath.     Cardiovascular:  Positive for chest pain (x one month since having COVID-19). Negative for leg swelling. Gastrointestinal:  Negative for abdominal pain. Musculoskeletal:  Negative for back pain. Allergic/Immunologic: Negative for immunocompromised state. Neurological:  Positive for headaches. All other systems reviewed and are negative. Physical Exam     Vitals:    09/29/22 1315 09/29/22 1448   BP: (!) 141/96    Pulse: (!) 122 (!) 102   Resp: 18    Temp: 97.8 °F (36.6 °C)    SpO2: 96% 98%   Weight: 68 kg (150 lb)    Height: 5' 5\" (1.651 m)      Physical Exam  Vitals and nursing note reviewed. Constitutional:       General: She is not in acute distress. Appearance: She is well-developed. She is not diaphoretic. Comments: Mildly anxious  female in NAD. Alert. In hallway bed   HENT:      Head: Normocephalic and atraumatic. Right Ear: External ear normal.      Left Ear: External ear normal. Tympanic membrane is not bulging. Nose: Nose normal. No mucosal edema or rhinorrhea. Right Sinus: No maxillary sinus tenderness or frontal sinus tenderness. Eyes:      General: No scleral icterus. Right eye: No discharge. Left eye: No discharge. Conjunctiva/sclera: Conjunctivae normal.   Cardiovascular:      Rate and Rhythm: Regular rhythm. Tachycardia present. Heart sounds: Normal heart sounds. No murmur heard. No friction rub. No gallop. Pulmonary:      Effort: Pulmonary effort is normal. No tachypnea, accessory muscle usage or respiratory distress. Breath sounds: Normal breath sounds. No decreased breath sounds, wheezing, rhonchi or rales. Musculoskeletal:         General: Normal range of motion. Cervical back: Normal range of motion and neck supple. Right lower leg: No edema. Left lower leg: No edema. Lymphadenopathy:      Cervical: No cervical adenopathy. Skin:     General: Skin is warm and dry. Neurological:      Mental Status: She is alert and oriented to person, place, and time. Psychiatric:         Mood and Affect: Mood is anxious. Judgment: Judgment normal.           Diagnostic Study Results     Labs -     Recent Results (from the past 12 hour(s))   CBC WITH AUTOMATED DIFF    Collection Time: 09/29/22  1:36 PM   Result Value Ref Range    WBC 5.6 4.6 - 13.2 K/uL    RBC 4.23 4.20 - 5.30 M/uL    HGB 15.6 12.0 - 16.0 g/dL    HCT 45.2 (H) 35.0 - 45.0 %    .9 (H) 78.0 - 100.0 FL    MCH 36.9 (H) 24.0 - 34.0 PG    MCHC 34.5 31.0 - 37.0 g/dL    RDW 13.0 11.6 - 14.5 %    PLATELET 404 898 - 238 K/uL    MPV 10.0 9.2 - 11.8 FL    NRBC 0.0 0  WBC    ABSOLUTE NRBC 0.00 0.00 - 0.01 K/uL    NEUTROPHILS 48 40 - 73 %    LYMPHOCYTES 38 21 - 52 %    MONOCYTES 12 (H) 3 - 10 %    EOSINOPHILS 1 0 - 5 %    BASOPHILS 1 0 - 2 %    IMMATURE GRANULOCYTES 0 0.0 - 0.5 %    ABS. NEUTROPHILS 2.7 1.8 - 8.0 K/UL    ABS. LYMPHOCYTES 2.1 0.9 - 3.6 K/UL    ABS. MONOCYTES 0.7 0.05 - 1.2 K/UL    ABS. EOSINOPHILS 0.0 0.0 - 0.4 K/UL    ABS. BASOPHILS 0.0 0.0 - 0.1 K/UL    ABS. IMM. GRANS. 0.0 0.00 - 0.04 K/UL    DF AUTOMATED     METABOLIC PANEL, COMPREHENSIVE    Collection Time: 09/29/22  1:36 PM   Result Value Ref Range    Sodium 133 (L) 136 - 145 mmol/L    Potassium 3.9 3.5 - 5.5 mmol/L    Chloride 97 (L) 100 - 111 mmol/L    CO2 20 (L) 21 - 32 mmol/L    Anion gap 16 3.0 - 18 mmol/L    Glucose 73 (L) 74 - 99 mg/dL    BUN 6 (L) 7.0 - 18 MG/DL    Creatinine 0.75 0.6 - 1.3 MG/DL    BUN/Creatinine ratio 8 (L) 12 - 20      GFR est AA >60 >60 ml/min/1.73m2    GFR est non-AA >60 >60 ml/min/1.73m2    Calcium 10.0 8.5 - 10.1 MG/DL    Bilirubin, total 0.7 0.2 - 1.0 MG/DL    ALT (SGPT) 92 (H) 13 - 56 U/L    AST (SGOT) 148 (H) 10 - 38 U/L    Alk.  phosphatase 65 45 - 117 U/L    Protein, total 8.4 (H) 6.4 - 8.2 g/dL    Albumin 4.6 3.4 - 5.0 g/dL    Globulin 3.8 2.0 - 4.0 g/dL    A-G Ratio 1.2 0.8 - 1.7     TROPONIN-HIGH SENSITIVITY    Collection Time: 09/29/22  1:36 PM   Result Value Ref Range    Troponin-High Sensitivity <3 0 - 54 ng/L       Radiologic Studies    No orders to display     CT Results  (Last 48 hours)      None          CXR Results  (Last 48 hours)      None            Medications given in the ED-  Medications   lactated ringers bolus infusion 1,000 mL (0 mL IntraVENous IV Completed 9/29/22 1633)         Medical Decision Making   I am the first provider for this patient. I reviewed the vital signs, available nursing notes, past medical history, past surgical history, family history and social history. Vital Signs-Reviewed the patient's vital signs. Pulse Oximetry Analysis - 98% on RA. NORMAL     Records Reviewed: Nursing Notes    Provider Notes (Medical Decision Making): arrhthymias, pericarditis, myocarditis, ACS/MI, CHF, COPD, PNA, PTX, anemia. Doubt PE as negative CTA chest 3 weeks ago    Procedures:  Procedures    ED Course:   2:17 PM Initial assessment performed. The patients presenting problems have been discussed, and they are in agreement with the care plan formulated and outlined with them. I have encouraged them to ask questions as they arise throughout their visit. Diagnosis and Disposition       Workup unremarkable. Suspect sxs related to recent COVID infection. Neg CTA chest on 09/07/2022 so doubt need for repeat. HR down to 102 with IVF. FU with PCP. Reasons to RTED discussed with pt. All questions answered. Pt feels comfortable going home at this time. Pt expressed understanding and she agrees with plan. 1. Atypical chest pain    2. Dyspnea, unspecified type        PLAN:  1. D/C Home  2. Discharge Medication List as of 9/29/2022  4:35 PM        CONTINUE these medications which have CHANGED    Details   methylPREDNISolone (Medrol, Khadar,) 4 mg tablet Take with food., Normal, Disp-1 Dose Pack, R-0           CONTINUE these medications which have NOT CHANGED    Details   ibuprofen (MOTRIN) 600 mg tablet Take 1 Tablet by mouth every six (6) hours as needed for Pain.  Take with food., Normal, Disp-20 Tablet, R-0           STOP taking these medications       lidocaine (Salonpas, lidocaine,) 4 % patch Comments:   Reason for Stopping:             3.   Follow-up Information       Follow up With Specialties Details Why Contact Belinda    Clotilde Mason, [address]  [address] [address] [facility] [id number] [phone]            _______________________________    Attestations: This note is prepared by Marla Turcios PA-C.  _______________________________        Please note that this dictation was completed with MyCube, the computer voice recognition software. Quite often unanticipated grammatical, syntax, homophones, and other interpretive errors are inadvertently transcribed by the computer software. Please disregard these errors. Please excuse any errors that have escaped final proofreading.   carli

## 2022-09-29 NOTE — ED TRIAGE NOTES
Pt ambulatory into ER c/o chest pain, SOB w/ exertion. Pt states she was dx w/covid earlier this month.

## 2022-10-03 ENCOUNTER — PATIENT OUTREACH (OUTPATIENT)
Dept: CASE MANAGEMENT | Age: 42
End: 2022-10-03

## 2022-10-03 NOTE — PROGRESS NOTES
Patient resolved from [address] Transitions episode on 10/03/22. Patient contacted regarding COVID-19 diagnosis. Discussed COVID-19 related testing which was not done at this time. Test results were not done. Patient informed of results, if available? N/a. Per ED note, patient tested positive for COVID-19 10 days prior. Patient/family has been provided the following resources and education related to COVID-19:                         Signs, symptoms and red flags related to COVID-19            CDC exposure and quarantine guidelines                            Patient currently reports that the following symptoms have resolved:  fatigue, cough, shortness of breath, chills or shaking, sweating, chest pain, fast heart rate, no new symptoms, no worsening symptoms, and loss of appetite . Patient reports she is feeling \"much better\". Since Friday, 9/30/22 she has energy and even washed dishes and did laundry this past weekend. She has been taking cough medicine at night and sleeping much better. She has not picked up the new Rx for Medrol dose pack d/t feeling so much better. No further outreach scheduled with this CTN. Episode of Care resolved. Patient has this CTN contact information if future needs arise.

## 2022-11-10 ENCOUNTER — HOSPITAL ENCOUNTER (EMERGENCY)
Age: 42
Discharge: HOME | End: 2022-11-11
Attending: EMERGENCY MEDICINE

## 2022-11-10 DIAGNOSIS — F10.10 ALCOHOL ABUSE: Primary | ICD-10-CM

## 2022-11-10 DIAGNOSIS — R45.851 SUICIDAL IDEATION: ICD-10-CM
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
## 2022-11-10 PROCEDURE — 96365 THER/PROPH/DIAG IV INF INIT: CPT

## 2022-11-10 PROCEDURE — 96366 THER/PROPH/DIAG IV INF ADDON: CPT

## 2022-11-10 PROCEDURE — 99285 EMERGENCY DEPT VISIT HI MDM: CPT

## 2022-11-10 PROCEDURE — 96375 TX/PRO/DX INJ NEW DRUG ADDON: CPT

## 2022-11-11 ENCOUNTER — APPOINTMENT (OUTPATIENT)
Dept: CT IMAGING | Age: 42
End: 2022-11-11
Attending: PHYSICIAN ASSISTANT

## 2022-11-11 ENCOUNTER — HOSPITAL ENCOUNTER (EMERGENCY)
Dept: CT IMAGING | Age: 42
Discharge: HOME | End: 2022-11-11
Attending: PHYSICIAN ASSISTANT

## 2022-11-11 VITALS
SYSTOLIC BLOOD PRESSURE: 119 MMHG | HEIGHT: 64 IN | DIASTOLIC BLOOD PRESSURE: 77 MMHG | OXYGEN SATURATION: 99 % | WEIGHT: 130 LBS | RESPIRATION RATE: 18 BRPM | BODY MASS INDEX: 22.2 KG/M2 | HEART RATE: 80 BPM | TEMPERATURE: 97.7 F

## 2022-11-11 LAB
ALBUMIN SERPL-MCNC: 4.2 G/DL (ref 3.4–5)
ALBUMIN/GLOB SERPL: 1.3 {RATIO} (ref 0.8–1.7)
ALP SERPL-CCNC: 49 U/L (ref 45–117)
ALT SERPL-CCNC: 74 U/L (ref 13–56)
AMPHET UR QL SCN: NEGATIVE
ANION GAP SERPL CALC-SCNC: 10 MMOL/L (ref 3–18)
APPEARANCE UR: CLEAR
APTT PPP: 27.4 SEC (ref 23–36.4)
AST SERPL-CCNC: 109 U/L (ref 10–38)
BACTERIA URNS QL MICRO: ABNORMAL /HPF
BARBITURATES UR QL SCN: NEGATIVE
BASOPHILS # BLD: 0.1 K/UL (ref 0–0.1)
BASOPHILS NFR BLD: 1 % (ref 0–2)
BENZODIAZ UR QL: NEGATIVE
BILIRUB SERPL-MCNC: 0.3 MG/DL (ref 0.2–1)
BILIRUB UR QL: NEGATIVE
BUN SERPL-MCNC: 6 MG/DL (ref 7–18)
BUN/CREAT SERPL: 12 (ref 12–20)
CALCIUM SERPL-MCNC: 9.4 MG/DL (ref 8.5–10.1)
CANNABINOIDS UR QL SCN: NEGATIVE
CHLORIDE SERPL-SCNC: 104 MMOL/L (ref 100–111)
CO2 SERPL-SCNC: 26 MMOL/L (ref 21–32)
COCAINE UR QL SCN: NEGATIVE
COLOR UR: YELLOW
CREAT SERPL-MCNC: 0.52 MG/DL (ref 0.6–1.3)
DIFFERENTIAL METHOD BLD: ABNORMAL
EOSINOPHIL # BLD: 0 K/UL (ref 0–0.4)
EOSINOPHIL NFR BLD: 0 % (ref 0–5)
EPITH CASTS URNS QL MICRO: ABNORMAL /LPF (ref 0–5)
ERYTHROCYTE [DISTWIDTH] IN BLOOD BY AUTOMATED COUNT: 15.7 % (ref 11.6–14.5)
ETHANOL SERPL-MCNC: 284 MG/DL (ref 0–3)
ETHANOL SERPL-MCNC: 417 MG/DL (ref 0–3)
GLOBULIN SER CALC-MCNC: 3.3 G/DL (ref 2–4)
GLUCOSE SERPL-MCNC: 121 MG/DL (ref 74–99)
GLUCOSE UR STRIP.AUTO-MCNC: NEGATIVE MG/DL
HCG UR QL: NEGATIVE
HCT VFR BLD AUTO: 38.4 % (ref 35–45)
HDSCOM,HDSCOM: NORMAL
HGB BLD-MCNC: 13.5 G/DL (ref 12–16)
HGB UR QL STRIP: NEGATIVE
IMM GRANULOCYTES # BLD AUTO: 0.1 K/UL (ref 0–0.04)
IMM GRANULOCYTES NFR BLD AUTO: 1 % (ref 0–0.5)
INR PPP: 0.9 (ref 0.8–1.2)
KETONES UR QL STRIP.AUTO: ABNORMAL MG/DL
LEUKOCYTE ESTERASE UR QL STRIP.AUTO: ABNORMAL
LIPASE SERPL-CCNC: 331 U/L (ref 73–393)
LYMPHOCYTES # BLD: 2.5 K/UL (ref 0.9–3.6)
LYMPHOCYTES NFR BLD: 28 % (ref 21–52)
MCH RBC QN AUTO: 38.2 PG (ref 24–34)
MCHC RBC AUTO-ENTMCNC: 35.2 G/DL (ref 31–37)
MCV RBC AUTO: 108.8 FL (ref 78–100)
METHADONE UR QL: NEGATIVE
MONOCYTES # BLD: 1 K/UL (ref 0.05–1.2)
MONOCYTES NFR BLD: 11 % (ref 3–10)
NEUTS SEG # BLD: 5.1 K/UL (ref 1.8–8)
NEUTS SEG NFR BLD: 59 % (ref 40–73)
NITRITE UR QL STRIP.AUTO: NEGATIVE
NRBC # BLD: 0 K/UL (ref 0–0.01)
NRBC BLD-RTO: 0 PER 100 WBC
OPIATES UR QL: NEGATIVE
PCP UR QL: NEGATIVE
PH UR STRIP: 6 [PH] (ref 5–8)
PLATELET # BLD AUTO: 246 K/UL (ref 135–420)
PMV BLD AUTO: 8.8 FL (ref 9.2–11.8)
POTASSIUM SERPL-SCNC: 3.8 MMOL/L (ref 3.5–5.5)
PROT SERPL-MCNC: 7.5 G/DL (ref 6.4–8.2)
PROT UR STRIP-MCNC: NEGATIVE MG/DL
PROTHROMBIN TIME: 12.9 SEC (ref 11.5–15.2)
RBC # BLD AUTO: 3.53 M/UL (ref 4.2–5.3)
RBC #/AREA URNS HPF: ABNORMAL /HPF (ref 0–5)
SODIUM SERPL-SCNC: 140 MMOL/L (ref 136–145)
SP GR UR REFRACTOMETRY: <1.005 (ref 1–1.03)
UROBILINOGEN UR QL STRIP.AUTO: 0.2 EU/DL (ref 0.2–1)
WBC # BLD AUTO: 8.7 K/UL (ref 4.6–13.2)
WBC URNS QL MICRO: ABNORMAL /HPF (ref 0–5)

## 2022-11-11 PROCEDURE — 74011250637 HC RX REV CODE- 250/637: Performed by: STUDENT IN AN ORGANIZED HEALTH CARE EDUCATION/TRAINING PROGRAM

## 2022-11-11 PROCEDURE — 85025 COMPLETE CBC W/AUTO DIFF WBC: CPT

## 2022-11-11 PROCEDURE — 72125 CT NECK SPINE W/O DYE: CPT

## 2022-11-11 PROCEDURE — 74011250637 HC RX REV CODE- 250/637: Performed by: PHYSICIAN ASSISTANT

## 2022-11-11 PROCEDURE — 81001 URINALYSIS AUTO W/SCOPE: CPT

## 2022-11-11 PROCEDURE — 85610 PROTHROMBIN TIME: CPT

## 2022-11-11 PROCEDURE — 82077 ASSAY SPEC XCP UR&BREATH IA: CPT

## 2022-11-11 PROCEDURE — 70450 CT HEAD/BRAIN W/O DYE: CPT

## 2022-11-11 PROCEDURE — 74011250636 HC RX REV CODE- 250/636: Performed by: STUDENT IN AN ORGANIZED HEALTH CARE EDUCATION/TRAINING PROGRAM

## 2022-11-11 PROCEDURE — 74011000250 HC RX REV CODE- 250: Performed by: PHYSICIAN ASSISTANT

## 2022-11-11 PROCEDURE — 71260 CT THORAX DX C+: CPT

## 2022-11-11 PROCEDURE — 74011000636 HC RX REV CODE- 636: Performed by: PHYSICIAN ASSISTANT

## 2022-11-11 PROCEDURE — 74011250636 HC RX REV CODE- 250/636: Performed by: PHYSICIAN ASSISTANT

## 2022-11-11 PROCEDURE — 80053 COMPREHEN METABOLIC PANEL: CPT

## 2022-11-11 PROCEDURE — 80307 DRUG TEST PRSMV CHEM ANLYZR: CPT

## 2022-11-11 PROCEDURE — 85730 THROMBOPLASTIN TIME PARTIAL: CPT

## 2022-11-11 PROCEDURE — 81025 URINE PREGNANCY TEST: CPT

## 2022-11-11 PROCEDURE — 83690 ASSAY OF LIPASE: CPT

## 2022-11-11 RX ORDER — CHLORDIAZEPOXIDE HYDROCHLORIDE 25 MG/1
25 CAPSULE, GELATIN COATED ORAL
Qty: 12 CAPSULE | Refills: 0 | Status: SHIPPED | OUTPATIENT
Start: 2022-11-11 | End: 2022-11-11 | Stop reason: SDUPTHER

## 2022-11-11 RX ORDER — ONDANSETRON 2 MG/ML
4 INJECTION INTRAMUSCULAR; INTRAVENOUS
Status: COMPLETED | OUTPATIENT
Start: 2022-11-11 | End: 2022-11-11

## 2022-11-11 RX ORDER — ACETAMINOPHEN 500 MG
1000 TABLET ORAL
Status: COMPLETED | OUTPATIENT
Start: 2022-11-11 | End: 2022-11-11

## 2022-11-11 RX ORDER — DIAZEPAM 5 MG/1
5 TABLET ORAL
Status: DISCONTINUED | OUTPATIENT
Start: 2022-11-11 | End: 2022-11-11 | Stop reason: HOSPADM

## 2022-11-11 RX ORDER — CHLORDIAZEPOXIDE HYDROCHLORIDE 25 MG/1
25 CAPSULE, GELATIN COATED ORAL
Qty: 12 CAPSULE | Refills: 0 | Status: SHIPPED | OUTPATIENT
Start: 2022-11-11

## 2022-11-11 RX ADMIN — SODIUM CHLORIDE, SODIUM LACTATE, POTASSIUM CHLORIDE, AND CALCIUM CHLORIDE 500 ML: 600; 310; 30; 20 INJECTION, SOLUTION INTRAVENOUS at 07:19

## 2022-11-11 RX ADMIN — IOPAMIDOL 100 ML: 612 INJECTION, SOLUTION INTRAVENOUS at 03:43

## 2022-11-11 RX ADMIN — ACETAMINOPHEN 1000 MG: 500 TABLET ORAL at 01:40

## 2022-11-11 RX ADMIN — DIAZEPAM 5 MG: 5 TABLET ORAL at 12:09

## 2022-11-11 RX ADMIN — THIAMINE HYDROCHLORIDE: 100 INJECTION, SOLUTION INTRAMUSCULAR; INTRAVENOUS at 01:13

## 2022-11-11 RX ADMIN — ONDANSETRON 4 MG: 2 INJECTION INTRAMUSCULAR; INTRAVENOUS at 01:40

## 2022-11-11 NOTE — ED NOTES
10:48 AM     Assumed care of patient from Dr. Melani García, plan to dispo after sober status, and discussing with case management due to suicidality. Patient complaint(s), current treatment plan, progression and available diagnostic results have been discussed thoroughly. ED Course as of 11/11/22 1839   [facility] Nov 11, 2022   0908 Per nursing, Patient still expressing suicidal plan to take pills. [DM]   4218 No acute pathology necessitating further emergent workup or hospital admission is suspected or found. Discussion with father, patient, no longer concern for suicidal expression. Will discharge home with librium. She is comfortable with the plan and discharge at this time. Expressed the importance of follow up for current symptoms and she agrees and was advised on what signs/symptoms to return immediately to the ER. [DM]      ED Course User Index  [DM] Marcela Mckeon MD         6:39 PM  At this time, I have participated in 6 phone calls to pharmacy to   Have outpatient medication changed.    Successful at this time    Antonia Champagne MD, [facility]  Emergency Medicine

## 2022-11-11 NOTE — DISCHARGE INSTRUCTIONS
Please carefully read all discharge instructions  Please follow up with your primary care doctor in 1-2 days    Please follow-up with a primary care physician and if you do not have one currently use the contact information provided to obtain an appointment. If none was provided please call the number on the back of your insurance card to locate a Primary care doctor. Many offices have \"cancellation lists\" that you can ask to be placed on; should a patient with an earlier appointment cancel you will be notified to take their place. Please return to the Emergency Room immediately if your symptoms worsen. Please return to the Emergency Department if you develop a fever, chills, cannot eat or drink due to nausea or vomiting, or if any of your symptoms worsen. If you do not have insurance you can use the below for your medications. [email or web address]    What are GoodRx coupons? GoodRx coupons will help you pay less than the cash price for your prescription. Leann Hush free to use and are accepted at virtually every [place] pharmacy. Your pharmacist will know how to enter the codes on the coupon to pull up the lowest discount available. [employer] Activation    Thank you for requesting access to [employer]. Please follow the instructions below to securely access and download your online medical record. [employer] allows you to send messages to your doctor, view your test results, renew your prescriptions, schedule appointments, and more. How Do I Sign Up? In your internet browser, go to [email or web address]  Click on the First Time User? Click Here link in the Sign In box. You will be redirect to the New Member Sign Up page. Enter your [employer] Access Code exactly as it appears below. You will not need to use this code after youve completed the sign-up process. If you do not sign up before the expiration date, you must request a new code.     [employer] Access Code: [email or web address]  Expires: 12/25/2022 10:59 PM    Enter the last four digits of your Social Security Number (xxxx) and Date of Birth (mm/dd/yyyy) as indicated and click Submit. You will be taken to the next sign-up page. Create a [employer] ID. This will be your [employer] login ID and cannot be changed, so think of one that is secure and easy to remember. Create a [employer] password. You can change your password at any time. Enter your Password Reset Question and Answer. This can be used at a later time if you forget your password. Enter your e-mail address. You will receive e-mail notification when new information is available in [address] Click Sign Up. You can now view and download portions of your medical record. Click the [employer] link to download a portable copy of your medical information. Additional Information    If you have questions, please visit the Frequently Asked Questions section of the [employer] website at [email or web address] Remember, [employer] is NOT to be used for urgent needs. For medical emergencies, dial 911. Rapid Access   [facility] Board   Beginning January 2, 2019, the [facility] Board ([facility]) will offer Rapid Access to service enrollment. Rapid Access, the [facility]'s Same-Day Access program, offers walk-in assessments for adults and children seeking services for mental health and substance use disorders. Please note that this process does not include seeing a psychiatrist or obtaining a prescription on the same day that an individual comes in to be assessed. Please read our Frequently Asked Questions below for more information about Rapid Access. If you have questions that are not answered below, please call our office at [phone] for questions related to Rapid Access for Adults and [phone] for questions related to Rapid Access for Children up to age 16 (or 25, if enrolled in school).  You may call Monday through Friday from 8:00 a.m. to 12:30 p.m. Frequently Asked Questions   What is Rapid Access? Instead of scheduling an intake appointment in advance, you may walk in without an appointment and be seen the same day to complete registration paperwork and a clinical assessment. After the completion of the clinical assessment, you may be referred for other services such as therapy and case management. Appointments for therapy services will be scheduled within 10 working days of this initial clinical assessment. Can I see a doctor right away? Currently, we do not offer Rapid Access to psychiatric services. However, when you walk in for your first visit, one of our qualified clinicians will conduct your clinical assessment to determine what services would best meet your needs. Once the clinical assessment is complete, he/she will provide information on your next steps, including how you can arrange to see a psychiatrist to get medication. Who can walk-in? Individuals seeking access to mental health and substance use disorder services who are not currently enrolled in [facility] services can walk in to request services. Clients who are currently enrolled and seeking an appointment outside of their next scheduled follow-up or looking for additional [facility] services should contact their current provider for assistance. When can I walk in? Adults can walk in Monday through Thursday 8:00 a.m. to 12:30 p.m. Children can walk in Monday through Wednesday from 8:00 am to 12:30 pm. Please plan to be here at least 2 1/2 hours to complete the assessment. You are encouraged to call in advance to best determine your wait time. Where do I walk in?    Adults are seen at [place] [address], [place], [facility] [address], [facility] [place] are seen at [place] [address], [place], [facility]   How much does it cost?   If you have insurance, you will be responsible to pay the copayment established by your insurance company and we will bill your insurance company for the balance. If you do not have insurance, you may qualify for a discounted rate based on your income and family size. [facility]'s Financial Services staff will assist you during your initial visit to provide more information. What if I don't have insurance? If you do not have insurance and you live in [place] or [address] [place] [address], you may be eligible for a reduced fee based on your income and family size, according to the sliding fee scale (available to verified [place] and [place] only). If you do not live in [place] or [address] [place] [address], we can give you the contact information for the [facility] where you live. Do I need to bring anything? Yes. The list below details what you must bring. We will not be able to complete the registration process if you do not have all the required documentation with you. Please bring the following items:   Photo identification (DMV or other government-issued photo ID, school or work picture ID)   Verification of your social security number (social security card, IRS tax return or transcript, W-2, social security award letter)   Proof of family size (custody/guardianship documents, birth certificates, insurance cards, separation/divorce papers, etc.)   Proof of address (recent bill, probation & parole letter, homeless/shelter verification, mail addressed to you within the last month)   Proof of income (last 2 [de-identified], [address], social security eligibility letter)   Insurance Card   Payment - your payment is required at the time of service. If you have insurance, you will be expected to pay your full copayment. If you don't have insurance, and you have proof of residency in [place] or [address] [place] [address], your fee may be determined based on your income and family size. [facility]'s Financial Services staff will assist you during your initial visit to provide more information.
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
## 2022-11-11 NOTE — ED NOTES
Pt ate 100% of her meal tray and her dad remains at the bedside, pt awake + alert x4. MD at the bedside at this time. VSS.
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
